# Patient Record
Sex: MALE | Employment: FULL TIME | URBAN - METROPOLITAN AREA
[De-identification: names, ages, dates, MRNs, and addresses within clinical notes are randomized per-mention and may not be internally consistent; named-entity substitution may affect disease eponyms.]

---

## 2018-05-04 ENCOUNTER — HOSPITAL ENCOUNTER (EMERGENCY)
Facility: CLINIC | Age: 43
Discharge: HOME | End: 2018-05-04
Attending: EMERGENCY MEDICINE | Admitting: EMERGENCY MEDICINE

## 2018-05-04 VITALS
TEMPERATURE: 98.7 F | BODY MASS INDEX: 31.75 KG/M2 | RESPIRATION RATE: 16 BRPM | HEART RATE: 67 BPM | WEIGHT: 215 LBS | SYSTOLIC BLOOD PRESSURE: 130 MMHG | OXYGEN SATURATION: 100 % | DIASTOLIC BLOOD PRESSURE: 94 MMHG

## 2018-05-04 DIAGNOSIS — L03.032 PARONYCHIA OF TOE, LEFT: ICD-10-CM

## 2018-05-04 PROCEDURE — 10060 I&D ABSCESS SIMPLE/SINGLE: CPT

## 2018-05-04 PROCEDURE — 99283 EMERGENCY DEPT VISIT LOW MDM: CPT | Mod: 25

## 2018-05-04 RX ORDER — CEPHALEXIN 500 MG/1
500 CAPSULE ORAL 4 TIMES DAILY
Qty: 28 CAPSULE | Refills: 0 | Status: SHIPPED | OUTPATIENT
Start: 2018-05-04 | End: 2018-05-11

## 2018-05-04 ASSESSMENT — ENCOUNTER SYMPTOMS: FEVER: 0

## 2018-05-04 NOTE — ED AVS SNAPSHOT
Emergency Department    [address] [place] [address]    Phone:  [phone]    Fax:  [phone]                                       Alli Milian   MRN: [id number]    Department:   Emergency Department   Date of Visit:  5/4/2018           After Visit Summary Signature Page     I have received my discharge instructions, and my questions have been answered. I have discussed any challenges I see with this plan with the nurse or doctor.    ..........................................................................................................................................  Patient/Patient Representative Signature      ..........................................................................................................................................  Patient Representative Print Name and Relationship to Patient    ..................................................               ................................................  Date                                            Time    ..........................................................................................................................................  Reviewed by Signature/Title    ...................................................              ..............................................  Date                                                            Time

## 2018-05-04 NOTE — DISCHARGE INSTRUCTIONS
Paronychia of the Finger or Toe  Paronychia is an infection near a fingernail or toenail. It usually occurs when an opening in the cuticle or an ingrown toenail lets bacteria under the skin.  The infection will need to be drained if pus is present. If the infection has been caught early, you may need only antibiotic treatment. Healing will take about 1 to 2 weeks.  Home care  Follow these guidelines when caring for yourself at home:    Clean and soak the toe or finger. Do this 2 times a day for the first 3 days. To do so:  ? Soak your foot or hand in a tub of warm water for 5 minutes. Or hold your toe or finger under a faucet of warm running water for 5 minutes.  ? Clean any crust away with soap and water using a cotton swab.  ? Put antibiotic ointment on the infected area.    Change the dressing daily or any time it gets dirty.    If you were given antibiotics, take them as directed until they are all gone.    If your infection is on a toe, wear comfortable shoes with a lot of toe room. You can also wear open-toed sandals while your toe heals.    You may use over-the-counter medicine (acetaminophen or ibuprofen to help with pain, unless another medicine was prescribed. If you have chronic liver or kidney disease, talk with your healthcare provider before using these medicines. Also talk with your provider if you've had a stomach ulcer or GI (gastrointestinal) bleeding.  Prevention  The following can prevent paronychia:    Avoid cutting or playing with your cuticles at home.    Don't bite your nails.    Don't suck on your thumbs or fingers.  Follow-up care  Follow up with your healthcare provider, or as advised.  When to seek medical advice  Call your healthcare provider right away if any of these occur:    Redness, pain, or swelling of the finger or toe gets worse    Red streaks in the skin leading away from the wound    Pus or fluid draining from the nail area    Fever of 100.4 F (38 C) or higher, or as directed  by your provider  Date Last Reviewed: 8/1/2016 2000-2017 [employer]. [address] [place] [address], [place] [address]. All rights reserved. This information is not intended as a substitute for professional medical care. Always follow your healthcare professional's instructions.

## 2018-05-04 NOTE — ED AVS SNAPSHOT
Emergency Department    [address] [facility] [address]    Phone:  [phone]    Fax:  [phone]                                       Alli Milian   MRN: [id number]    Department:   Emergency Department   Date of Visit:  5/4/2018           Patient Information     Date Of Birth          1975        Your diagnoses for this visit were:     Paronychia of toe, left        You were seen by Nella Mcbride MD.      Follow-up Information     Follow up with Julian Moura MD.    Specialty:  Family Practice    Why:  As needed    Contact information:    [facility]  [address] [facility] [address] [phone]          Discharge Instructions         Paronychia of the Finger or Toe  Paronychia is an infection near a fingernail or toenail. It usually occurs when an opening in the cuticle or an ingrown toenail lets bacteria under the skin.  The infection will need to be drained if pus is present. If the infection has been caught early, you may need only antibiotic treatment. Healing will take about 1 to 2 weeks.  Home care  Follow these guidelines when caring for yourself at home:    Clean and soak the toe or finger. Do this 2 times a day for the first 3 days. To do so:  ? Soak your foot or hand in a tub of warm water for 5 minutes. Or hold your toe or finger under a faucet of warm running water for 5 minutes.  ? Clean any crust away with soap and water using a cotton swab.  ? Put antibiotic ointment on the infected area.    Change the dressing daily or any time it gets dirty.    If you were given antibiotics, take them as directed until they are all gone.    If your infection is on a toe, wear comfortable shoes with a lot of toe room. You can also wear open-toed sandals while your toe heals.    You may use over-the-counter medicine (acetaminophen or ibuprofen to help with pain, unless another medicine was prescribed. If you have chronic liver or kidney  disease, talk with your healthcare provider before using these medicines. Also talk with your provider if you've had a stomach ulcer or GI (gastrointestinal) bleeding.  Prevention  The following can prevent paronychia:    Avoid cutting or playing with your cuticles at home.    Don't bite your nails.    Don't suck on your thumbs or fingers.  Follow-up care  Follow up with your healthcare provider, or as advised.  When to seek medical advice  Call your healthcare provider right away if any of these occur:    Redness, pain, or swelling of the finger or toe gets worse    Red streaks in the skin leading away from the wound    Pus or fluid draining from the nail area    Fever of 100.4 F (38 C) or higher, or as directed by your provider  Date Last Reviewed: 8/1/2016 2000-2017 The "[employer]". [address] [place] [address]. All rights reserved. This information is not intended as a substitute for professional medical care. Always follow your healthcare professional's instructions.          24 Hour Appointment Hotline       To make an appointment at any [facility], call [phone] ([phone]). If you don't have a family doctor or clinic, we will help you find one. [facility] clinics are conveniently located to serve the needs of you and your family.             Review of your medicines      START taking        Dose / Directions Last dose taken    cephALEXin 500 MG capsule   Commonly known as:  KEFLEX   Dose:  500 mg   Quantity:  28 capsule        Take 1 capsule (500 mg) by mouth 4 times daily for 7 days   Refills:  0          Our records show that you are taking the medicines listed below. If these are incorrect, please call your family doctor or clinic.        Dose / Directions Last dose taken    HYDROcodone-acetaminophen 5-500 MG per tablet   Commonly known as:  VICODIN   Dose:  1-2 tablet   Quantity:  20 tablet        Take 1-2 tablets by mouth every 6 hours as needed for pain.    Refills:  0        naproxen 500 MG tablet   Commonly known as:  NAPROSYN   Dose:  500 mg   Quantity:  30 tablet        Take 1 tablet by mouth 2 times daily as needed.   Refills:  0                Prescriptions were sent or printed at these locations (1 Prescription)                   Other Prescriptions                Printed at Department/Unit printer (1 of 1)         cephALEXin (KEFLEX) 500 MG capsule                Orders Needing Specimen Collection     None      Pending Results     No orders found from 5/2/2018 to 5/5/2018.            Pending Culture Results     No orders found from 5/2/2018 to 5/5/2018.            Pending Results Instructions     If you had any lab results that were not finalized at the time of your Discharge, you can call the ED Lab Result RN at [phone]. You will be contacted by this team for any positive Lab results or changes in treatment. The nurses are available 7 days a week from 10A to 6:30P.  You can leave a message 24 hours per day and they will return your call.        Test Results From Your Hospital Stay               Clinical Quality Measure: Blood Pressure Screening     Your blood pressure was checked while you were in the emergency department today. The last reading we obtained was  BP: (!) 130/94 . Please read the guidelines below about what these numbers mean and what you should do about them.  If your systolic blood pressure (the top number) is less than 120 and your diastolic blood pressure (the bottom number) is less than 80, then your blood pressure is normal. There is nothing more that you need to do about it.  If your systolic blood pressure (the top number) is 120-139 or your diastolic blood pressure (the bottom number) is 80-89, your blood pressure may be higher than it should be. You should have your blood pressure rechecked within a year by a primary care provider.  If your systolic blood pressure (the top number) is 140 or greater or your diastolic blood  "pressure (the bottom number) is 90 or greater, you may have high blood pressure. High blood pressure is treatable, but if left untreated over time it can put you at risk for heart attack, stroke, or kidney failure. You should have your blood pressure rechecked by a primary care provider within the next 4 weeks.  If your provider in the emergency department today gave you specific instructions to follow-up with your doctor or provider even sooner than that, you should follow that instruction and not wait for up to 4 weeks for your follow-up visit.        Thank you for choosing [facility]       Thank you for choosing [facility] for your care. Our goal is always to provide you with excellent care. Hearing back from our patients is one way we can continue to improve our services. Please take a few minutes to complete the written survey that you may receive in the mail after you visit with us. Thank you!        [employer] Information     [facility] [employer] lets you send messages to your doctor, view your test results, renew your prescriptions, schedule appointments and more. To sign up, go to [email or web address] . Click on \"Log in\" on the left side of the screen, which will take you to the Welcome page. Then click on \"Sign up Now\" on the right side of the page.     You will be asked to enter the access code listed below, as well as some personal information. Please follow the directions to create your username and password.     Your access code is: [email or web address]  Expires: 2018  1:34 PM     Your access code will  in 90 days. If you need help or a new code, please call your [facility] clinic or [phone].        Care EveryWhere ID     This is your Care EveryWhere ID. This could be used by other organizations to access your [facility] medical records  [id number]        Equal Access to Services     LEANDRA WALLACE AH: Jennie Salcedo, thalia singh, karyn mcnally" ah. So [facility] [phone].    ATENCIÓN: Si habla español, tiene a ochoa disposición servicios gratuitos de asistencia lingüística. Llame al [phone].    We comply with applicable federal civil rights laws and [place] laws. We do not discriminate on the basis of race, color, national origin, age, disability, sex, sexual orientation, or gender identity.            After Visit Summary       This is your record. Keep this with you and show to your community pharmacist(s) and doctor(s) at your next visit.

## 2018-05-04 NOTE — LETTER
May 4, 2018      To Whom It May Concern:      Alli KAREN Milian was seen in our Emergency Department today, 05/04/18.  I expect his condition to improve over the next 2 days.  He may return to work/school when improved.    Sincerely,        Nella Mcbride MD

## 2018-05-04 NOTE — ED PROVIDER NOTES
History     Chief Complaint:  Toe Pain    HPI   Alli Milian is a 43 year old male who presents with left great toe pain. The patient cut his nails Saturday night and cut the lateral aspect of it quite low and then the next day began to have some irritation on the part of this toe. It progressively got inflamed and tender. The patient took his soft off a work yesterday and a purulent fluid had come out. He comes in today due to persistent pain.  He denies fevers and trauma. He has never had this problem before.     Allergies:  No known drug allergies    Medications:    Naproxen    Past Medical History:    The patient does not have any past pertinent medical history.    Past Surgical History:    History reviewed. No pertinent surgical history.    Family History:    History reviewed. No pertinent family history.     Social History:  Marital Status:     Tobacco Status: Current Smoker  Alcohol Use: Rarely        Review of Systems   Constitutional: Negative for fever.   Skin:        Positive: Red/Swelling/Painful Right 1st Toe     Physical Exam     Patient Vitals for the past 24 hrs:   BP Temp Temp src Pulse Resp SpO2 Weight   05/04/18 1334 (!) 130/94 - - 67 16 100 % -   05/04/18 1254 133/87 98.7  F (37.1  C) Oral 70 14 99 % 97.5 kg (215 lb)     Physical Exam    Physical Exam   Constitutional:  Patient is oriented to person, place, and time. They appear well-developed and well-nourished. Mild distress secondary to toe pain.    HENT:   Eyes:    Conjunctivae normal and EOM are normal. Pupils are equal, round, and reactive to light.   Neck:    Normal range of motion.   Cardiovascular: Normal rate, regular rhythm and normal heart sounds.  Exam reveals no gallop and no friction rub.  No murmur heard.  Pulmonary/Chest:  Effort normal and breath sounds normal. Patient has no wheezes. Patient has no rales.   Musculoskeletal:  Normal range of motion. Patient exhibits no edema.   Neurological:   Patient is  alert and oriented to person, place, and time. Patient has normal strength. No cranial nerve deficit or sensory deficit. GCS 15.  Skin:   The left great toe has an obvious paronychia with a small amount of erythema around the cuticle, there is no blistering. He has tenderness greatest on the lateral aspect but no active drainage visualized, no necrosis.   Psychiatric:   Patient has a normal mood and affect. Patient's behavior is normal. Judgment and thought content normal.     Emergency Department Course     Procedures:      Procedure: Incision and Drainage     LOCATIONS:  Left Great Toe    ANESTHESIA:  Digital block using Lidocaine 1% plain, total of 2 mLs    PREPARATION:  Cleansed with Normal Saline and Shur Clens    PROCEDURE:  I used a small iris scissors to distract the skin from the toe nail on the lateral aspect. A small amount of purulent fluid was expressed.         Wound treatment included Purulent Drainage.  Packing consisted of No Packing.  Appropriate dressing was applied to cover the area.    Patient Status: Patient tolerated the procedure well. There were no complications      Emergency Department Course:  Past medical records, nursing notes, and vitals reviewed.  1258: I performed an exam of the patient and obtained history, as documented above. GCS 15.  The patient was placed on cardiac monitoring and pulse oximetry.  Incision and drainage repair procedure performed.   1333: I rechecked the patient. Findings and plan explained to the Patient. Patient discharged home with instructions regarding supportive care, medications, and reasons to return. The importance of close follow-up was reviewed.     Impression & Plan      Medical Decision Making:  [facility] 43 year [facility] male who presents for evaluation of a painful left greater toe. This is consistent with a paronychia.  Incision and drainage yielded a small amount of pus, the nail bed was also decompressed.  No signs of lymphangitis,  gangrene, osteomyelitis or other worrisome soft tissue/bony issue at this time.  Due to the small amount of surrounding erythema, I will place him on a short course of Keflex.Plan to f/u with podiatry or primary for wound recheck.   Bacitracin and wound care discussed BID.  Stable for discharge.      Diagnosis:    ICD-10-CM    1. Paronychia of toe, left L03.032        Disposition:  discharged to home    Discharge Medications:  Discharge Medication List as of 5/4/2018  2:06 PM      START taking these medications    Details   cephALEXin (KEFLEX) 500 MG capsule Take 1 capsule (500 mg) by mouth 4 times daily for 7 days, Disp-28 capsule, R-0, Local Print             Brenden Claudio  5/4/2018    EMERGENCY DEPARTMENT  I, Brenden Claudio, am serving as a scribe at 12:58 PM on 5/4/2018 to document services personally performed by Nella Mcbride MD based on my observations and the provider's statements to me.         Nella Mcbride MD  05/05/18 8935

## 2020-11-24 ENCOUNTER — HOSPITAL ENCOUNTER (EMERGENCY)
Facility: CLINIC | Age: 45
Discharge: HOME | End: 2020-11-24
Attending: PHYSICIAN ASSISTANT | Admitting: PHYSICIAN ASSISTANT
Payer: OTHER MISCELLANEOUS

## 2020-11-24 ENCOUNTER — APPOINTMENT (OUTPATIENT)
Dept: GENERAL RADIOLOGY | Facility: CLINIC | Age: 45
End: 2020-11-24
Attending: PHYSICIAN ASSISTANT
Payer: OTHER MISCELLANEOUS

## 2020-11-24 VITALS
RESPIRATION RATE: 16 BRPM | OXYGEN SATURATION: 100 % | SYSTOLIC BLOOD PRESSURE: 148 MMHG | TEMPERATURE: 98.9 F | HEIGHT: 69 IN | HEART RATE: 106 BPM | WEIGHT: 200 LBS | BODY MASS INDEX: 29.62 KG/M2 | DIASTOLIC BLOOD PRESSURE: 94 MMHG

## 2020-11-24 DIAGNOSIS — S61.412A LACERATION OF LEFT HAND WITHOUT FOREIGN BODY, INITIAL ENCOUNTER: ICD-10-CM

## 2020-11-24 DIAGNOSIS — S61.217A LACERATION OF LEFT LITTLE FINGER WITHOUT FOREIGN BODY WITHOUT DAMAGE TO NAIL, INITIAL ENCOUNTER: ICD-10-CM

## 2020-11-24 DIAGNOSIS — S61.209A FINGER, OPEN WOUNDS WITH TENDON INJURY, INITIAL ENCOUNTER: ICD-10-CM

## 2020-11-24 PROCEDURE — 90715 TDAP VACCINE 7 YRS/> IM: CPT | Performed by: PHYSICIAN ASSISTANT

## 2020-11-24 PROCEDURE — 29130 APPL FINGER SPLINT STATIC: CPT | Mod: F4

## 2020-11-24 PROCEDURE — 99284 EMERGENCY DEPT VISIT MOD MDM: CPT

## 2020-11-24 PROCEDURE — 250N000011 HC RX IP 250 OP 636: Performed by: PHYSICIAN ASSISTANT

## 2020-11-24 PROCEDURE — 90471 IMMUNIZATION ADMIN: CPT | Performed by: PHYSICIAN ASSISTANT

## 2020-11-24 PROCEDURE — 73140 X-RAY EXAM OF FINGER(S): CPT | Mod: LT

## 2020-11-24 PROCEDURE — 12002 RPR S/N/AX/GEN/TRNK2.6-7.5CM: CPT

## 2020-11-24 RX ADMIN — CLOSTRIDIUM TETANI TOXOID ANTIGEN (FORMALDEHYDE INACTIVATED), CORYNEBACTERIUM DIPHTHERIAE TOXOID ANTIGEN (FORMALDEHYDE INACTIVATED), BORDETELLA PERTUSSIS TOXOID ANTIGEN (GLUTARALDEHYDE INACTIVATED), BORDETELLA PERTUSSIS FILAMENTOUS HEMAGGLUTININ ANTIGEN (FORMALDEHYDE INACTIVATED), BORDETELLA PERTUSSIS PERTACTIN ANTIGEN, AND BORDETELLA PERTUSSIS FIMBRIAE 2/3 ANTIGEN 0.5 ML: 5; 2; 2.5; 5; 3; 5 INJECTION, SUSPENSION INTRAMUSCULAR at 18:27

## 2020-11-24 ASSESSMENT — ENCOUNTER SYMPTOMS: WOUND: 1

## 2020-11-24 ASSESSMENT — MIFFLIN-ST. JEOR: SCORE: 1777.57

## 2020-11-24 NOTE — ED PROVIDER NOTES
"History     Chief Complaint:  Left Little Finger Injury    HPI  Alli Milian is a 45 year old male who presents for evaluation of left little finger injury. The patient reports he was at work when his left little finger got stuck under a closing garage door and was crushed and he sustained a laceration to this hand as well. He notes he has some other mild injuries and abrasions to his left hand but his main concern is the left little finger. He denies any other known symptoms or concerns at this time. Last tetanus unknown.  He denies numbness.    Allergies:  No Known Allergies    Medications:    Naproxen    Past Medical History:    The patient denies any significant past medical history.    Past Surgical History:    The patient does not have any pertinent past surgical history.     Family History:    No past pertinent family history.    Social History:  The patient presents to the ED alone.   Marital Status:   Tobacco Use: Current Some Day Smoker  Alcohol Use: Yes  Drug Use: No    Review of Systems   Musculoskeletal:        Left Hand Pain  Left Little Finger Pain   Skin: Positive for wound.   All other systems reviewed and are negative.    Physical Exam     Patient Vitals for the past 24 hrs:   BP Temp Temp src Pulse Resp SpO2 Height Weight   11/24/20 1725 (!) 148/94 98.9  F (37.2  C) Oral 106 16 100 % 1.753 m (5' 9\") 90.7 kg (200 lb)       Physical Exam  General: Alert and oriented.    Head: Normocephalic.  External ears and nose normal.    Eyes: Pupils equal and round.  Normal tracking.    Pulmonary/Chest: Effort and rate normal    MSK: Normal range of motion in hand and fingers.    SKIN: There is a 3 cm laceration over the lateral proximal portion of the left fifth finger which wraps around to the flexor side.  No visualized foreign body, pulsatile bleeding, or tendon disruption on examination under bloodless field.  There is also a superficial 1.5 cm laceration over the left dorsal hand.  Warm " and dry with strong radial pulse and normal capillary refill.    NEURO:  Question weakness of flexion at 5th left finger PIP and DIP joints  Normal extension.  Normal sensation through the radial/ulnar/median nerve distributions.    PSYCH:  Normal affect      Emergency Department Course     Imaging:  Radiology findings were communicated with the patient who voiced understanding of the findings.    Fingers XR, 2-3 Views Left:  Mild soft tissue swelling. No acute fracture or malalignment. No significant degenerative changes. Report per radiology     Procedures    Laceration Repair        LACERATION:  A simple and superficial clean 3 cm laceration.      LOCATION:  Left 5th Finger      FUNCTION:  Distally sensation and circulation are intact. Question weakness of flexion.  Normal strength of extension.      ANESTHESIA:  Digital block using 0.5% Bupivacaine w/o epi total of 2 mLs      PREPARATION:  Irrigation and Scrubbing with Normal Saline and Shur Clens      DEBRIDEMENT:  no debridement and wound explored, no foreign body found      CLOSURE:  Wound was closed with One Layer.  Skin closed with 5 x 5.0 Prolene using interrupted sutures.      Laceration Repair        LACERATION:  A simple and superficial clean 1.5 cm laceration.      LOCATION:  Dorsal hand.      FUNCTION: Distally sensation, circulation, motor and tendon function are intact.      ANESTHESIA:  Local using 1% lidocaine w/ epi total of 1 mLs      PREPARATION:  Irrigation and Scrubbing with Normal Saline and Shur Clens      DEBRIDEMENT:  no debridement and wound explored, no foreign body found      CLOSURE:  Wound was closed with One Layer.  Skin closed with 2 x 5.0 Prolene using interrupted sutures.    Interventions:  1827 Tdap Adacel 0.5 mL IM    Emergency Department Course:  Past medical records, nursing notes, and vitals reviewed.    1732 I performed an exam of the patient as documented above.      1741 Lacerations cleaned and prepped for repair.       The patient was sent for a finger xr while in the emergency department, results above.     1812 Laceration repairs performed. Plan of care discussed and questions answered.      Findings and plan explained to the Patient. Patient discharged home with instructions regarding supportive care, medications, and reasons to return. The importance of close follow-up was reviewed.    I personally reviewed the imaging results with the Patient and answered all related questions prior to discharge.     Impression & Plan     Medical Decision Makin-year-old male presents with lacerations to the left fifth finger and hand.  X-ray without evidence of fracture.  Neurovascular status intact but question possible flexor tendon injury of the left fifth finger, and therefore we will have the patient schedule follow-up with orthopedic hand specialist.  He understands the importance of doing this to maintain proper functioning of the hand in a timely manner.  Wound was anesthetized, cleaned, and repaired as above.  Tetanus updated.  Discussed watch for signs of infection and these were provided in writing.  Fifth finger placed in removable splint to help prevent wound dehiscence.  Follow-up with PCP in 12 days for suture removal or alternatively as directed by hand specialist at that follow-up appointment.    Diagnosis:    ICD-10-CM    1. Laceration of left little finger without foreign body without damage to nail, initial encounter  S61.217A    2. Finger, open wounds with tendon injury, initial encounter  S61.209A     Possible   3. Laceration of left hand without foreign body, initial encounter  S61.412A        Disposition:  Discharged to home.    Scribe Disclosure:  I, Alejandro Shepard, am serving as a scribe at 5:30 PM on 2020 to document services personally performed by Presley Carmona PA based on my observations and the provider's statements to me.      Presley Carmona PA-C  20 [id number]

## 2020-11-24 NOTE — ED AVS SNAPSHOT
[facility] Emergency Dept  [address] [address]  Phone: [phone]  Fax: [phone]                                    Alli Milian   MRN: [id number]    Department: [facility] Emergency Dept   Date of Visit: 11/24/2020           After Visit Summary Signature Page    I have received my discharge instructions, and my questions have been answered. I have discussed any challenges I see with this plan with the nurse or doctor.    ..........................................................................................................................................  Patient/Patient Representative Signature      ..........................................................................................................................................  Patient Representative Print Name and Relationship to Patient    ..................................................               ................................................  Date                                   Time    ..........................................................................................................................................  Reviewed by Signature/Title    ...................................................              ..............................................  Date                                               Time          22EPIC Rev 08/18

## 2020-12-04 ENCOUNTER — OFFICE VISIT (OUTPATIENT)
Dept: FAMILY MEDICINE | Facility: CLINIC | Age: 45
End: 2020-12-04
Payer: OTHER MISCELLANEOUS

## 2020-12-04 VITALS
SYSTOLIC BLOOD PRESSURE: 118 MMHG | BODY MASS INDEX: 31.31 KG/M2 | TEMPERATURE: 98.6 F | HEIGHT: 69 IN | DIASTOLIC BLOOD PRESSURE: 74 MMHG | HEART RATE: 75 BPM | OXYGEN SATURATION: 95 % | WEIGHT: 211.4 LBS

## 2020-12-04 DIAGNOSIS — S61.412A LACERATION OF LEFT HAND WITHOUT FOREIGN BODY, INITIAL ENCOUNTER: Primary | ICD-10-CM

## 2020-12-04 DIAGNOSIS — R20.2 LEFT HAND PARESTHESIA: ICD-10-CM

## 2020-12-04 PROCEDURE — 99213 OFFICE O/P EST LOW 20 MIN: CPT | Performed by: NURSE PRACTITIONER

## 2020-12-04 ASSESSMENT — MIFFLIN-ST. JEOR: SCORE: 1829.28

## 2020-12-04 NOTE — PATIENT INSTRUCTIONS
Follow up with hand specialist    You can see [facility] if there are not good options for you in [place]

## 2020-12-04 NOTE — PROGRESS NOTES
"Subjective     Alli KAREN Vikram Fraga is a 45 year old male who presents to clinic today for the following health issues:    HPI         ED/UC Followup:    Facility:  [facility] Emergency Dept  Date of visit: 11/24/2020  Reason for visit:     1. Laceration of left little finger without foreign body without damage to nail, initial encounter  S61.217A     2. Finger, open wounds with tendon injury, initial encounter  S61.209A       Possible   3. Laceration of left hand without foreign body, initial encounter        Current Status: Healing     No bleeding or drainage from the wounds  Still experiencing numbness of 2nd and 5th finger  Tingling in 2nd finger  No pain in his hand unless he fully bends fingers  ED doc told him he may have cut part of his tendon in his 5th finger  No fever or chills  Full ROM of 2nd finger and slightly limited ROM of 5th finger    No past medical history on file.  No family history on file.  No past surgical history on file.  Social History     Tobacco Use     Smoking status: Current Some Day Smoker     Smokeless tobacco: Never Used   Substance Use Topics     Alcohol use: Yes     Comment: Rare     Current Outpatient Medications   Medication Sig Dispense Refill     HYDROcodone-acetaminophen (VICODIN) 5-500 MG per tablet Take 1-2 tablets by mouth every 6 hours as needed for pain. (Patient not taking: Reported on 12/4/2020) 20 tablet 0     naproxen (NAPROSYN) 500 MG tablet Take 1 tablet by mouth 2 times daily as needed. (Patient not taking: Reported on 12/4/2020) 30 tablet 0     No Known Allergies    Reviewed and updated as needed this visit by clinical staff and provider     Review of Systems   As detailed above      Objective    /74 (BP Location: Left arm, Patient Position: Sitting, Cuff Size: Adult Regular)   Pulse 75   Temp 98.6  F (37  C) (Temporal)   Ht 1.753 m (5' 9\")   Wt 95.9 kg (211 lb 6.4 oz)   SpO2 95%   BMI 31.22 kg/m    Body mass index is 31.22 " kg/m .  Physical Exam  Constitutional:       Appearance: Normal appearance.   Pulmonary:      Effort: Pulmonary effort is normal.   Musculoskeletal:      Comments: Moderately limited ROM of left 5th finger   Skin:     General: Skin is warm and dry.      Comments: Left hand:  2 sutures 2nd MCP   5 sutures radial 5th finger  Lacs well-approximated without signs of infection   Neurological:      Mental Status: He is alert.   Psychiatric:         Mood and Affect: Mood normal.             Assessment & Plan     Laceration of left hand without foreign body, initial encounter  Wound appears to be healing well, skin was well-approximated. 7 sutures removed without difficulty and without bleeding or drainage.   Referred to hand specialist to further evaluate numbness and potential tendon injury. Reassuring patient has full strength in all digits.   - Orthopedic & Spine  Referral    Left hand paresthesia  - Orthopedic & Spine  Referral         Seen in collaboration with Leta Valdez RN, nurse practitioner student    RAFAEL Doss CNP  [facility]

## 2020-12-04 NOTE — LETTER
[facility]  [address] [address]  Phone: [phone]    December 4, 2020        Alli Fraga  [address] [place] [id number]          To whom it may concern:    RE: Alli Fraga    Patient was seen and treated today at our clinic and missed work. Please excuse him for 2 more weeks. He can return on 12/21/2020      Please contact me for questions or concerns.      Sincerely,        RAFAEL Doss CNP

## 2021-01-15 ENCOUNTER — HEALTH MAINTENANCE LETTER (OUTPATIENT)
Age: 46
End: 2021-01-15

## 2021-04-16 ENCOUNTER — HOSPITAL ENCOUNTER (EMERGENCY)
Facility: CLINIC | Age: 46
Discharge: HOME | End: 2021-04-16
Attending: EMERGENCY MEDICINE | Admitting: EMERGENCY MEDICINE
Payer: OTHER GOVERNMENT

## 2021-04-16 VITALS
SYSTOLIC BLOOD PRESSURE: 141 MMHG | HEART RATE: 107 BPM | TEMPERATURE: 96.9 F | HEIGHT: 69 IN | RESPIRATION RATE: 16 BRPM | BODY MASS INDEX: 31.1 KG/M2 | WEIGHT: 210 LBS | DIASTOLIC BLOOD PRESSURE: 79 MMHG | OXYGEN SATURATION: 96 %

## 2021-04-16 DIAGNOSIS — R10.32 LLQ ABDOMINAL PAIN: ICD-10-CM

## 2021-04-16 DIAGNOSIS — U07.1 2019 NOVEL CORONAVIRUS DISEASE (COVID-19): ICD-10-CM

## 2021-04-16 LAB
ALBUMIN SERPL-MCNC: 3.6 G/DL (ref 3.4–5)
ALBUMIN UR-MCNC: 20 MG/DL
ALP SERPL-CCNC: 140 U/L (ref 40–150)
ALT SERPL W P-5'-P-CCNC: 87 U/L (ref 0–70)
ANION GAP SERPL CALCULATED.3IONS-SCNC: 4 MMOL/L (ref 3–14)
APPEARANCE UR: CLEAR
AST SERPL W P-5'-P-CCNC: 42 U/L (ref 0–45)
BASOPHILS # BLD AUTO: 0 10E9/L (ref 0–0.2)
BASOPHILS NFR BLD AUTO: 0.2 %
BILIRUB SERPL-MCNC: 0.6 MG/DL (ref 0.2–1.3)
BILIRUB UR QL STRIP: NEGATIVE
BUN SERPL-MCNC: 12 MG/DL (ref 7–30)
CALCIUM SERPL-MCNC: 8.2 MG/DL (ref 8.5–10.1)
CHLORIDE SERPL-SCNC: 105 MMOL/L (ref 94–109)
CO2 SERPL-SCNC: 26 MMOL/L (ref 20–32)
COLOR UR AUTO: YELLOW
CREAT SERPL-MCNC: 0.86 MG/DL (ref 0.66–1.25)
DIFFERENTIAL METHOD BLD: NORMAL
EOSINOPHIL # BLD AUTO: 0 10E9/L (ref 0–0.7)
EOSINOPHIL NFR BLD AUTO: 0.5 %
ERYTHROCYTE [DISTWIDTH] IN BLOOD BY AUTOMATED COUNT: 12.6 % (ref 10–15)
FLUAV RNA RESP QL NAA+PROBE: NEGATIVE
FLUBV RNA RESP QL NAA+PROBE: NEGATIVE
GFR SERPL CREATININE-BSD FRML MDRD: >90 ML/MIN/{1.73_M2}
GLUCOSE SERPL-MCNC: 159 MG/DL (ref 70–99)
GLUCOSE UR STRIP-MCNC: NEGATIVE MG/DL
HCT VFR BLD AUTO: 42.2 % (ref 40–53)
HGB BLD-MCNC: 14.5 G/DL (ref 13.3–17.7)
HGB UR QL STRIP: NEGATIVE
IMM GRANULOCYTES # BLD: 0 10E9/L (ref 0–0.4)
IMM GRANULOCYTES NFR BLD: 0.5 %
KETONES UR STRIP-MCNC: NEGATIVE MG/DL
LABORATORY COMMENT REPORT: ABNORMAL
LEUKOCYTE ESTERASE UR QL STRIP: NEGATIVE
LYMPHOCYTES # BLD AUTO: 0.8 10E9/L (ref 0.8–5.3)
LYMPHOCYTES NFR BLD AUTO: 17.9 %
MCH RBC QN AUTO: 27.9 PG (ref 26.5–33)
MCHC RBC AUTO-ENTMCNC: 34.4 G/DL (ref 31.5–36.5)
MCV RBC AUTO: 81 FL (ref 78–100)
MONOCYTES # BLD AUTO: 0.4 10E9/L (ref 0–1.3)
MONOCYTES NFR BLD AUTO: 9.1 %
MUCOUS THREADS #/AREA URNS LPF: PRESENT /LPF
NEUTROPHILS # BLD AUTO: 3.2 10E9/L (ref 1.6–8.3)
NEUTROPHILS NFR BLD AUTO: 71.8 %
NITRATE UR QL: NEGATIVE
NRBC # BLD AUTO: 0 10*3/UL
NRBC BLD AUTO-RTO: 0 /100
PH UR STRIP: 6 PH (ref 5–7)
PLATELET # BLD AUTO: 184 10E9/L (ref 150–450)
POTASSIUM SERPL-SCNC: 3.8 MMOL/L (ref 3.4–5.3)
PROT SERPL-MCNC: 7.8 G/DL (ref 6.8–8.8)
RBC # BLD AUTO: 5.19 10E12/L (ref 4.4–5.9)
RBC #/AREA URNS AUTO: 1 /HPF (ref 0–2)
RSV RNA SPEC QL NAA+PROBE: ABNORMAL
SARS-COV-2 RNA RESP QL NAA+PROBE: POSITIVE
SODIUM SERPL-SCNC: 135 MMOL/L (ref 133–144)
SOURCE: ABNORMAL
SP GR UR STRIP: 1.02 (ref 1–1.03)
SPECIMEN SOURCE: ABNORMAL
SQUAMOUS #/AREA URNS AUTO: 0 /HPF (ref 0–1)
UROBILINOGEN UR STRIP-MCNC: 0 MG/DL (ref 0–2)
WBC # BLD AUTO: 4.4 10E9/L (ref 4–11)
WBC #/AREA URNS AUTO: <1 /HPF (ref 0–5)

## 2021-04-16 PROCEDURE — 87636 SARSCOV2 & INF A&B AMP PRB: CPT | Performed by: EMERGENCY MEDICINE

## 2021-04-16 PROCEDURE — C9803 HOPD COVID-19 SPEC COLLECT: HCPCS

## 2021-04-16 PROCEDURE — 81001 URINALYSIS AUTO W/SCOPE: CPT | Performed by: EMERGENCY MEDICINE

## 2021-04-16 PROCEDURE — 80053 COMPREHEN METABOLIC PANEL: CPT | Performed by: EMERGENCY MEDICINE

## 2021-04-16 PROCEDURE — 85025 COMPLETE CBC W/AUTO DIFF WBC: CPT | Performed by: EMERGENCY MEDICINE

## 2021-04-16 PROCEDURE — 250N000013 HC RX MED GY IP 250 OP 250 PS 637: Performed by: EMERGENCY MEDICINE

## 2021-04-16 PROCEDURE — 99283 EMERGENCY DEPT VISIT LOW MDM: CPT

## 2021-04-16 RX ORDER — ACETAMINOPHEN 325 MG/1
650 TABLET ORAL ONCE
Status: COMPLETED | OUTPATIENT
Start: 2021-04-16 | End: 2021-04-16

## 2021-04-16 RX ADMIN — ACETAMINOPHEN 650 MG: 325 TABLET, FILM COATED ORAL at 19:47

## 2021-04-16 ASSESSMENT — ENCOUNTER SYMPTOMS
ABDOMINAL PAIN: 1
BLOOD IN STOOL: 0
DYSURIA: 0
FEVER: 1
COUGH: 1
HEMATURIA: 0
SHORTNESS OF BREATH: 0

## 2021-04-16 ASSESSMENT — MIFFLIN-ST. JEOR: SCORE: 1817.93

## 2021-04-17 NOTE — ED PROVIDER NOTES
"  History   Chief Complaint:  Abdominal Pain and Fever    HPI   Alli Fraga is a 46 year old male who presents with abdominal pain and fever. The patient states he has had a cough and a fever since Monday. He describes a left lower quadrant discomfort while having bowel movements. He denies dysuria, hematuria or blood in stool. He denies chest pain or shortness of breath. He is otherwise healthy. Has been taking tylenol and last took tylenol this morning.    Review of Systems   Constitutional: Positive for fever.   Respiratory: Positive for cough. Negative for shortness of breath.    Cardiovascular: Negative for chest pain.   Gastrointestinal: Positive for abdominal pain. Negative for blood in stool.   Genitourinary: Negative for dysuria and hematuria.   All other systems reviewed and are negative.    Allergies:  The patient has no known allergies.     Medications:  Vicodin  Naprosyn    Past Medical History:     The patient denies past medical history.     Social History:  The patient was unaccompanied to the emergency department.     Physical Exam     Patient Vitals for the past 24 hrs:   BP Temp Temp src Pulse Resp SpO2 Height Weight   04/16/21 2006 -- -- -- 107 -- 96 % -- --   04/16/21 1702 (!) 141/79 96.9  F (36.1  C) Temporal 119 16 98 % 1.753 m (5' 9\") 95.3 kg (210 lb)       Physical Exam  VS: Reviewed per above  HENT: normal speech  EYES: sclera anicteric  CV: Rate as noted, regular rhythm.   RESP: Effort normal. Breath sounds are normal bilaterally.  GI: no tenderness/rebound/guarding, not distended.  NEURO: Alert, moving all extremities  MSK: No deformity of the extremities  SKIN: Warm and dry    Emergency Department Course     Laboratory:   CBC: WBC 4.4, HGB 14.5,    CMP: Glucose 159 (H), Calcium 8.2 (L), ALT 87 (H), o/w WNL (Creatinine 0.86)     UA with microscopic: Protein Albumin 20 (A), Mucous Present (A), o/w WNL     Symptomatic Influenza A/B & SARS-CoV2 (COVID19) Virus PCR " Multiplex: positive (A!)     Emergency Department Course:    Reviewed:  I reviewed nursing notes and vitals    Assessments:  1940 I obtained history and examined the patient as noted above.   2026 I rechecked the patient and explained findings.     Interventions:  1947 Tylenol 650 mg PO    Disposition:  The patient was discharged to home.     Impression & Plan   CMS Diagnoses: None    Medical Decision Making:  Patient presents to the ER for evaluation of cough, fever, intermittent left lower quadrant abdominal pain.  On arrival he has low-grade regular tachycardia without fever or hypoxemia.  He has no reproducible tenderness over the abdomen.  Lungs are clear to auscultation.  Patient's Covid test is positive here today.  No reports of shortness of breath to suggest significant Covid pneumonia or other processes such as PE.  No indication for admission for Covid related concerns at this time.  Based on reassuring exam and lack of leukocytosis I have low suspicion for sinister or surgical intra-abdominal process such as diverticulitis or perforated viscus or bowel obstruction.  Urinalysis without evidence of UTI or hematuria to suggest underlying renal colic.  Discussed natural course of Covid and reasons to return to the ER.  I encouraged primary care follow-up as well.  Patient was discharged in stable condition.    Covid-19  Alli Fraga was evaluated during a global COVID-19 pandemic, which necessitated consideration that the patient might be at risk for infection with the SARS-CoV-2 virus that causes COVID-19.   Applicable protocols for evaluation were followed during the patient's care.   COVID-19 was considered as part of the patient's evaluation. The plan for testing is:  a test was obtained during this visit.    Diagnosis:    ICD-10-CM    1. 2019 novel coronavirus disease (COVID-19)  U07.1 UA with Microscopic   2. LLQ abdominal pain  R10.32      Scribe Disclosure:  Crystal PONCE, am serving as a  scribe at 7:39 PM on 4/16/2021 to document services personally performed by Dawit Burton MD based on my observations and the provider's statements to me.     Dawit Burton MD  04/16/21 4406

## 2021-04-17 NOTE — DISCHARGE INSTRUCTIONS
Discharge Instructions  COVID-19    COVID-19 is the disease caused by a new coronavirus. The virus spreads from person-to-person primarily by droplets when an infected person coughs or sneezes and the droplet either lands on another person or that other person touches a surface with the droplet on it. There are tests available to diagnose COVID-19. There is no specific treatment or medicine for the disease.    You may have been diagnosed with COVID, may be being tested for COVID and have a pending test result, or may have been exposed to COVID.    Symptoms of COVID-19    Many people have no symptoms or mild symptoms.  Symptoms may usually appear 4 to 5 days (up to 14 days) after contact with a person with COVID-19. Some people will get severe symptoms and pneumonia. Usual symptoms are:     ? Fever  ? Cough  ? Trouble breathing    Less common symptoms are: Headache, body aches, sore throat, sneezing, diarrhea, loss of taste or smell.    Isolation and Quarantine    You were seen because you have symptoms, had an exposure, or had some other concern about possible COVID. The best way to stop the spread of the virus is to avoid contact with others.  Isolation refers to sick people staying away from people who are not sick. A person in quarantine is limiting activity because they were exposed and are waiting to see if they might become sick.    If you test positive for COVID, you should stay home (isolation) for at least 10 days after your symptoms began, and for 24 hours with no fever and improvement of symptoms--whichever is longer. (Your fever should be gone for 24 hours without using fever-reducing medicine). If you have no symptoms, you should stay home (isolation) for 10 days from the day of the test.    For example, if you have a fever and cough for 6 days, you need to stay home 4 more days with no fever for a total of 10 days. Or, if you have a fever and cough for 10 days, you need to stay home one more day with  no fever for a total of 11 days.    If you have a high-risk exposure to COVID (you spent 15 minutes or more within six feet of somebody who has COVID), you should stay home (quarantine) for 14 days. Even if you test negative for COVID, the CDC recommends a 14-day quarantine from the time of your last exposure to that individual. There are options for a shortened (<14 day quarantine) you can review at:    https://www.health.[facility].[place]./diseases/coronavirus/close.html#long    If you have symptoms but a negative test, you should stay at home until you are symptom-free and without fever for 24 hours, using the same judgment you would for when it is safe to return to work/school from strep throat, influenza, or the common cold. If you worsen, you should consider being re-evaluated.    If you are being tested for COVID and your test is pending, you should stay home until you know your test result.    How should I protect myself and others?    Do not go to work or school. Have a friend or relative do your shopping. Do not use public transportation (bus, train) or ridesharing (Lyft, Uber).    Separate yourself from other people in your home. As much as possible, you should stay in one room and away from other people in your home. Also, use a separate bathroom, if possible. Avoid handling pets or other animals while sick.     Wear a facemask if you need to be around other people and cover your mouth and nose with a tissue when you cough or sneeze.     Avoid sharing personal household items. You should not share dishes, drinking glasses, forks/knives/spoons, towels, or bedding with other people in your home. After using these items, they should be washed with soap and water. Clean parts of your home that are touched often (doorknobs, faucets, countertops, etc.) daily.     Wash your hands often with soap and water for at least 20 seconds or use an alcohol-based hand  containing at least 60% alcohol.     Avoid touching  your face.    Treat your symptoms. You can take Acetaminophen (Tylenol) to treat body aches and fever as needed for comfort. Ibuprofen (Advil or Motrin) can be used as well if you still have symptoms after taking Tylenol. Drink fluids. Rest.    Watch for worsening symptoms such as shortness of breath/difficulty breathing or very severe weakness.    Employers/workplaces are being asked by the Centers for Disease Control (CDC) to not request notes/documentation for you to return to work or prove that you were ill. You may choose to show your employer this paperwork. Also, repeat testing should not be required to return to work.    Exercise/Sports in rare cases, COVID could affect your heart in a way that makes exercise or participation in sports dangerous.  If you have a mild COVID illness (fever, cough, sore throat, and similar symptoms but no difficulty breathing or abnormalities of the lung): After your COVID symptoms have resolved, wait 14-days before returning to activity.  If you have more than a mild illness (meaning that you have problems with your breathing or lungs) or if you participate in competitive or strenuous activity or have a history of heart disease: Please see your primary doctor/provider prior to return to activity/competition.    Return to the Emergency Department if:    If you are developing worsening breathing, shortness of breath, or feel worse you should seek medical attention.  If you are uncertain, contact your health care provider/clinic. If you need emergency medical attention, call 911 and tell them you have been ill.    Discharge Instructions  Abdominal Pain    Abdominal pain (belly pain) can be caused by many things. Your evaluation today does not show the exact cause for your pain. Your provider today has decided that it is unlikely your pain is due to a life threatening problem, or a problem requiring surgery or hospital admission. Sometimes those problems cannot be found right away,  so it is very important that you follow up as directed.  Sometimes only the changes which occur over time allow the cause of your pain to be found.    Generally, every Emergency Department visit should have a follow-up clinic visit with either a primary or a specialty clinic/provider. Please follow-up as instructed by your emergency provider today. With abdominal pain, we often recommend very close follow-up, such as the following day.    ADULTS:  Return to the Emergency Department right away if:    You get an oral temperature above 102oF or as directed by your provider.  You have blood in your stools. This may be bright red or appear as black, tarry stools.    You keep vomiting (throwing up) or cannot drink liquids.  You see blood when you vomit.   You cannot have a bowel movement or you cannot pass gas.  Your stomach gets bloated or bigger.  Your skin or the whites of your eyes look yellow.  You faint.  You have bloody, frequent or painful urination (peeing).  You have new symptoms or anything that worries you.    CHILDREN:  Return to the Emergency Department right away if your child has any of the above-listed symptoms or the following:    Pushes your hand away or screams/cries when his/her belly is touched.  You notice your child is very fussy or weak.  Your child is very tired and is too tired to eat or drink.  Your child is dehydrated.  Signs of dehydration can be:  Significant change in the amount of wet diapers/urine.  Your infant or child starts to have dry mouth and lips, or no saliva (spit) or tears.    PREGNANT WOMEN:  Return to the Emergency Department right away if you have any of the above-listed symptoms or the following:    You have bleeding, leaking fluid or passing tissue from the vagina.  You have worse pain or cramping, or pain in your shoulder or back.  You have vomiting that will not stop.  You have a temperature of 100oF or more.  Your baby is not moving as much as usual.  You faint.  You get  "a bad headache with or without eye problems and abdominal pain.  You have a seizure.  You have unusual discharge from your vagina and abdominal pain.    Abdominal pain is pretty common during pregnancy.  Your pain may or may not be related to your pregnancy. You should follow-up closely with your OB provider so they can evaluate you and your baby.  Until you follow-up with your regular provider, do the following:     Avoid sex and do not put anything in your vagina.  Drink clear fluids.  Only take medications approved by your provider.    MORE INFORMATION:    Appendicitis:  A possible cause of abdominal pain in any person who still has their appendix is acute appendicitis. Appendicitis is often hard to diagnose.  Testing does not always rule out early appendicitis or other causes of abdominal pain. Close follow-up with your provider and re-evaluations may be needed to figure out the reason for your abdominal pain.    Follow-up:  It is very important that you make an appointment with your clinic and go to the appointment.  If you do not follow-up with your primary provider, it may result in missing an important development which could result in permanent injury or disability and/or lasting pain.  If there is any problem keeping your appointment, call your provider or return to the Emergency Department.    Medications:  Take your medications as directed by your provider today.  Before using over-the-counter medications, ask your provider and make sure to take the medications as directed.  If you have any questions about medications, ask your provider.    Diet:  Resume your normal diet as much as possible, but do not eat fried, fatty or spicy foods while you have pain.  Do not drink alcohol or have caffeine.  Do not smoke tobacco.    Probiotics: If you have been given an antibiotic, you may want to also take a probiotic pill or eat yogurt with live cultures. Probiotics have \"good bacteria\" to help your intestines stay " healthy. Studies have shown that probiotics help prevent diarrhea (loose stools) and other intestine problems (including C. diff infection) when you take antibiotics. You can buy these without a prescription in the pharmacy section of the store.     If you were given a prescription for medicine here today, be sure to read all of the information (including the package insert) that comes with your prescription.  This will include important information about the medicine, its side effects, and any warnings that you need to know about.  The pharmacist who fills the prescription can provide more information and answer questions you may have about the medicine.  If you have questions or concerns that the pharmacist cannot address, please call or return to the Emergency Department.       Remember that you can always come back to the Emergency Department if you are not able to see your regular provider in the amount of time listed above, if you get any new symptoms, or if there is anything that worries you.

## 2021-04-19 ENCOUNTER — PATIENT OUTREACH (OUTPATIENT)
Dept: CARE COORDINATION | Facility: CLINIC | Age: 46
End: 2021-04-19

## 2021-04-19 DIAGNOSIS — U07.1 2019 NOVEL CORONAVIRUS DISEASE (COVID-19): Primary | ICD-10-CM

## 2021-04-19 NOTE — PROGRESS NOTES
Clinic Care Coordination Contact  Community Health Worker Initial Outreach       The patient stated that he was going to follow up with a provider at this time. The CHW asked if there were any questions or concerns at this time and there were not. The CHW will close the referral.

## 2021-09-18 ENCOUNTER — HEALTH MAINTENANCE LETTER (OUTPATIENT)
Age: 46
End: 2021-09-18

## 2022-03-05 ENCOUNTER — HEALTH MAINTENANCE LETTER (OUTPATIENT)
Age: 47
End: 2022-03-05

## 2022-11-20 ENCOUNTER — HEALTH MAINTENANCE LETTER (OUTPATIENT)
Age: 47
End: 2022-11-20

## 2023-04-15 ENCOUNTER — HEALTH MAINTENANCE LETTER (OUTPATIENT)
Age: 48
End: 2023-04-15